# Patient Record
Sex: MALE | Race: WHITE | NOT HISPANIC OR LATINO | Employment: OTHER | URBAN - NONMETROPOLITAN AREA
[De-identification: names, ages, dates, MRNs, and addresses within clinical notes are randomized per-mention and may not be internally consistent; named-entity substitution may affect disease eponyms.]

---

## 2021-08-30 ENCOUNTER — OFFICE VISIT (OUTPATIENT)
Dept: FAMILY MEDICINE | Facility: OTHER | Age: 81
End: 2021-08-30
Attending: NURSE PRACTITIONER
Payer: MEDICARE

## 2021-08-30 VITALS
TEMPERATURE: 98 F | HEIGHT: 69 IN | BODY MASS INDEX: 36.35 KG/M2 | RESPIRATION RATE: 16 BRPM | DIASTOLIC BLOOD PRESSURE: 76 MMHG | WEIGHT: 245.4 LBS | HEART RATE: 72 BPM | SYSTOLIC BLOOD PRESSURE: 146 MMHG

## 2021-08-30 DIAGNOSIS — L50.9 HIVES: Primary | ICD-10-CM

## 2021-08-30 PROCEDURE — G0463 HOSPITAL OUTPT CLINIC VISIT: HCPCS

## 2021-08-30 PROCEDURE — 99203 OFFICE O/P NEW LOW 30 MIN: CPT | Performed by: NURSE PRACTITIONER

## 2021-08-30 RX ORDER — AMLODIPINE BESYLATE 10 MG/1
TABLET ORAL
COMMUNITY
Start: 2021-03-03

## 2021-08-30 RX ORDER — LEVOTHYROXINE SODIUM 100 UG/1
TABLET ORAL
COMMUNITY
Start: 2021-06-11

## 2021-08-30 RX ORDER — LOSARTAN POTASSIUM 100 MG/1
TABLET ORAL EVERY 24 HOURS
COMMUNITY

## 2021-08-30 RX ORDER — LATANOPROST 50 UG/ML
SOLUTION/ DROPS OPHTHALMIC
COMMUNITY
Start: 2021-08-26

## 2021-08-30 RX ORDER — EPINEPHRINE 0.3 MG/.3ML
0.3 INJECTION SUBCUTANEOUS
COMMUNITY
Start: 2020-06-12

## 2021-08-30 RX ORDER — PREDNISONE 20 MG/1
20 TABLET ORAL DAILY PRN
Qty: 3 TABLET | Refills: 0 | Status: SHIPPED | OUTPATIENT
Start: 2021-08-30 | End: 2021-09-02

## 2021-08-30 ASSESSMENT — MIFFLIN-ST. JEOR: SCORE: 1818.51

## 2021-08-30 ASSESSMENT — PAIN SCALES - GENERAL: PAINLEVEL: NO PAIN (0)

## 2021-08-31 NOTE — PROGRESS NOTES
HPI:    Arjun Pepe is a 81 year old male  who presents to [facility] today for rash.    Patient states he has a history of getting an itchy rash and hives if he eats shellfish.  Patient states he usually gets the rash on his arms or legs.  Today patient went out for [employer] and ate some crabs, shrimp, and scallops.  Patient states within 20 minutes he developed a red pinching itchy rash on his abdomen.  Patient denies any previous or current anaphylactic symptoms such as throat swelling, scratchiness or thickness, lip swelling or tingling, tongue thickness or swelling, chest tightness, shortness of breath or difficulty breathing.    States he normally takes 125 mg dose of Benadryl if he gets the rash from the shellfish and it resolves immediately.  Patient did not have any Benadryl on hand as [place] cabin so he came to the clinic to be evaluated.   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  Patient states he also did put on a shirt today that has been stored at his cabin for years and he wonders if there was may be something in the shirt that he could be reacting to instead of the shellfish.        No past medical history on file.  No past surgical history on file.  Social History     Tobacco Use     Smoking status: Former Smoker     Smokeless tobacco: Never Used     Tobacco comment: Quit smoking 40 years ago   Substance Use Topics     Alcohol use: Not Currently     Comment: couple beers per day     Current Outpatient Medications   Medication Sig Dispense Refill     EPINEPHrine (ANY BX GENERIC EQUIV) 0.3 MG/0.3ML injection 2-pack Inject 0.3 mg into the muscle       amLODIPine (NORVASC) 10 MG tablet        latanoprost (XALATAN) 0.005 % ophthalmic solution        levothyroxine (SYNTHROID/LEVOTHROID) 100 MCG tablet        losartan (COZAAR) 100 MG tablet Take by mouth every 24 hours       Allergies   Allergen Reactions     Bee Venom Anaphylaxis         Past medical  "history, past surgical history, current medications and allergies reviewed and accurate to the best of my knowledge.        ROS:  Refer to HPI    BP (!) 146/76 (BP Location: Left arm, Patient Position: Sitting, Cuff Size: Adult Large)   Pulse 72   Temp 98  F (36.7  C) (Tympanic)   Resp 16   Ht 1.753 m (5' 9\")   Wt 111.3 kg (245 lb 6.4 oz)   BMI 36.24 kg/m      EXAM:  General Appearance: Well appearing young elderly male, appropriate appearance for age. No acute distress  Orophayrnx:  voice clear.    Respiratory: normal chest wall and respirations.  Normal effort. No cough appreciated.   Musculoskeletal:  Equal movement of bilateral upper extremities.  Equal movement of bilateral lower extremities.  Normal gait.    Dermatological: Center of abdomen at belt line with large flat patchy light erythematous rash without associated raised border or central clearing, no vesicles or pustules.  Left upper arm with single raised erythematous hive.  Left upper back with single raised erythematous hive.  Psychological: normal affect, alert, oriented, and pleasant.           ASSESSMENT/PLAN:    I have reviewed the nursing notes.  I have reviewed the findings, diagnosis, plan and need for follow up with the patient.    (L50.9) Hives  (primary encounter diagnosis)  Comment: History of hives when consuming shellfish and patient ate shellfish today.  No history of anaphylaxis retion.  Plan:   Benadryl 25 mg x 1 and if symptoms do not resolve patient will start prescription predniSONE (DELTASONE) 20 MG daily x 3 days, take with food.    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    Discussed warning signs/symptoms indicative of need to f/u  Follow up if symptoms persist or worsen or concerns      I explained my diagnostic considerations and recommendations to the patient, who voiced understanding and agreement with the treatment plan. All questions were answered. We discussed potential side " effects of any prescribed or recommended therapies, as well as expectations for response to treatments.

## 2021-08-31 NOTE — PATIENT INSTRUCTIONS
Start with Benadryl  If benadryl does not resolve the hives then may start prednisone once daily up to 3 days as needed

## 2021-08-31 NOTE — NURSING NOTE
"Chief Complaint   Patient presents with     Allergic Reaction     possible allergic reaction on abdomen- but feels a pinching feeling internally. Starting to also itch under arms     Patient stated he has a \"sometimes\" allergy to shell fish.   Had shellfish this afternoon. Started feeling a pinching his abdomen about 15 minutes later, then started itching on his abdomen and getting hot to the touch.    Initial BP (!) 146/76 (BP Location: Left arm, Patient Position: Sitting, Cuff Size: Adult Large)   Pulse 72   Temp 98  F (36.7  C) (Tympanic)   Resp 16   Ht 1.753 m (5' 9\")   Wt 111.3 kg (245 lb 6.4 oz)   BMI 36.24 kg/m   Estimated body mass index is 36.24 kg/m  as calculated from the following:    Height as of this encounter: 1.753 m (5' 9\").    Weight as of this encounter: 111.3 kg (245 lb 6.4 oz).  Medication Reconciliation: Completed     [facility] Care Directive Reviewed    Zay Lentz LPN  "